# Patient Record
Sex: MALE | Race: WHITE | NOT HISPANIC OR LATINO | Employment: OTHER | URBAN - METROPOLITAN AREA
[De-identification: names, ages, dates, MRNs, and addresses within clinical notes are randomized per-mention and may not be internally consistent; named-entity substitution may affect disease eponyms.]

---

## 2021-01-07 ENCOUNTER — IMMUNIZATION (OUTPATIENT)
Dept: FAMILY MEDICINE | Facility: CLINIC | Age: 86
End: 2021-01-07
Payer: MEDICARE

## 2021-01-07 DIAGNOSIS — Z23 NEED FOR VACCINATION: ICD-10-CM

## 2021-01-07 PROCEDURE — 91300 COVID-19, MRNA, LNP-S, PF, 30 MCG/0.3 ML DOSE VACCINE: CPT | Mod: PBBFAC | Performed by: FAMILY MEDICINE

## 2021-01-28 ENCOUNTER — IMMUNIZATION (OUTPATIENT)
Dept: FAMILY MEDICINE | Facility: CLINIC | Age: 86
End: 2021-01-28
Payer: MEDICARE

## 2021-01-28 DIAGNOSIS — Z23 NEED FOR VACCINATION: Primary | ICD-10-CM

## 2021-01-28 PROCEDURE — 0002A COVID-19, MRNA, LNP-S, PF, 30 MCG/0.3 ML DOSE VACCINE: CPT | Mod: PBBFAC | Performed by: FAMILY MEDICINE

## 2021-01-28 PROCEDURE — 91300 COVID-19, MRNA, LNP-S, PF, 30 MCG/0.3 ML DOSE VACCINE: CPT | Mod: PBBFAC | Performed by: FAMILY MEDICINE

## 2022-02-02 PROBLEM — S42.291A CLOSED 4-PART FRACTURE OF PROXIMAL HUMERUS, RIGHT, INITIAL ENCOUNTER: Status: ACTIVE | Noted: 2022-02-02

## 2022-04-06 ENCOUNTER — TELEPHONE (OUTPATIENT)
Dept: ORTHOPEDICS | Facility: CLINIC | Age: 87
End: 2022-04-06
Payer: MEDICARE

## 2022-04-06 NOTE — TELEPHONE ENCOUNTER
Referral received from , s/w pt, appointment scheduled with  for right hand decreased ROM and sensation.

## 2022-05-04 PROBLEM — R20.2 NUMBNESS AND TINGLING IN RIGHT HAND: Status: ACTIVE | Noted: 2022-05-04

## 2022-05-04 PROBLEM — R20.0 NUMBNESS AND TINGLING IN RIGHT HAND: Status: ACTIVE | Noted: 2022-05-04

## 2022-05-16 ENCOUNTER — OFFICE VISIT (OUTPATIENT)
Dept: ORTHOPEDICS | Facility: CLINIC | Age: 87
End: 2022-05-16
Payer: MEDICARE

## 2022-05-16 VITALS — HEIGHT: 70 IN | BODY MASS INDEX: 31.21 KG/M2 | WEIGHT: 218 LBS

## 2022-05-16 DIAGNOSIS — G56.01 CARPAL TUNNEL SYNDROME ON RIGHT: Primary | ICD-10-CM

## 2022-05-16 PROCEDURE — 1101F PT FALLS ASSESS-DOCD LE1/YR: CPT | Mod: CPTII,S$GLB,, | Performed by: ORTHOPAEDIC SURGERY

## 2022-05-16 PROCEDURE — 1159F MED LIST DOCD IN RCRD: CPT | Mod: CPTII,S$GLB,, | Performed by: ORTHOPAEDIC SURGERY

## 2022-05-16 PROCEDURE — 1159F PR MEDICATION LIST DOCUMENTED IN MEDICAL RECORD: ICD-10-PCS | Mod: CPTII,S$GLB,, | Performed by: ORTHOPAEDIC SURGERY

## 2022-05-16 PROCEDURE — 3288F FALL RISK ASSESSMENT DOCD: CPT | Mod: CPTII,S$GLB,, | Performed by: ORTHOPAEDIC SURGERY

## 2022-05-16 PROCEDURE — 1126F PR PAIN SEVERITY QUANTIFIED, NO PAIN PRESENT: ICD-10-PCS | Mod: CPTII,S$GLB,, | Performed by: ORTHOPAEDIC SURGERY

## 2022-05-16 PROCEDURE — 1126F AMNT PAIN NOTED NONE PRSNT: CPT | Mod: CPTII,S$GLB,, | Performed by: ORTHOPAEDIC SURGERY

## 2022-05-16 PROCEDURE — 99999 PR PBB SHADOW E&M-EST. PATIENT-LVL II: CPT | Mod: PBBFAC,,, | Performed by: ORTHOPAEDIC SURGERY

## 2022-05-16 PROCEDURE — 99999 PR PBB SHADOW E&M-EST. PATIENT-LVL II: ICD-10-PCS | Mod: PBBFAC,,, | Performed by: ORTHOPAEDIC SURGERY

## 2022-05-16 PROCEDURE — 99203 PR OFFICE/OUTPT VISIT, NEW, LEVL III, 30-44 MIN: ICD-10-PCS | Mod: S$GLB,,, | Performed by: ORTHOPAEDIC SURGERY

## 2022-05-16 PROCEDURE — 99203 OFFICE O/P NEW LOW 30 MIN: CPT | Mod: S$GLB,,, | Performed by: ORTHOPAEDIC SURGERY

## 2022-05-16 PROCEDURE — 3288F PR FALLS RISK ASSESSMENT DOCUMENTED: ICD-10-PCS | Mod: CPTII,S$GLB,, | Performed by: ORTHOPAEDIC SURGERY

## 2022-05-16 PROCEDURE — 1101F PR PT FALLS ASSESS DOC 0-1 FALLS W/OUT INJ PAST YR: ICD-10-PCS | Mod: CPTII,S$GLB,, | Performed by: ORTHOPAEDIC SURGERY

## 2022-05-16 NOTE — PROGRESS NOTES
5/16/2022    Chief Complaint:  Chief Complaint   Patient presents with    Right Hand - Numbness, Pain, Swelling       HPI:  Max Ross is a 87 y.o. male, who presents to clinic today has a history of pain numbness and tingling to his right hand.  He states that this started after he had a shoulder replacement surgery.  He states that he was set up to do a nerve test but did not end up getting that done.  He states that he did have an injection in his right wrist a few weeks ago which did improve his pain but he is still having numbness.  He is here today for further evaluation and treatment options    PMHX:  Past Medical History:   Diagnosis Date    A-fib     Bladder cancer     x 3 occurrances.     Cataract of both eyes     Hypertension        PSHX:  Past Surgical History:   Procedure Laterality Date    APPENDECTOMY      CATARACT EXTRACTION Left 05/04/2022    FINGER SURGERY Right     ORCHIECTOMY      REVERSE TOTAL SHOULDER ARTHROPLASTY Right 02/03/2022    Procedure: ARTHROPLASTY, SHOULDER, TOTAL, REVERSE;  Surgeon: Leonidas Baer II, MD;  Location: [facility];  Service: Orthopedics;  Laterality: Right;    TONSILLECTOMY         FMHX:  Family History   Problem Relation Age of Onset    Cancer Mother     Cancer Father     Breast cancer Daughter        SOCHX:  Social History     Tobacco Use    Smoking status: Never Smoker    Smokeless tobacco: Never Used   Substance Use Topics    Alcohol use: Yes     Comment: occasional       ALLERGIES:  Patient has no known allergies.    CURRENT MEDICATIONS:  Current Outpatient Medications on File Prior to Visit   Medication Sig Dispense Refill    acetaminophen (TYLENOL) 325 MG tablet Take 2 tablets (650 mg total) by mouth every 6 (six) hours. 56 tablet 0    ELIQUIS 2.5 mg Tab Take 2.5 mg by mouth 2 (two) times a day.      enalapril (VASOTEC) 10 MG tablet Take 10 mg by mouth once daily.      gabapentin (NEURONTIN) 300 MG capsule Take 300 mg by mouth 4 (four)  "times daily.      MEN'S MULTI-VITAMIN ORAL Take 1 capsule by mouth once daily.      vitamins  A,C,E-zinc-copper (PRESERVISION AREDS) 14,320-650-200 unit-mg-unit Cap Take 1 capsule by mouth once daily.       No current facility-administered medications on file prior to visit.       REVIEW OF SYSTEMS:  Review of Systems   Constitutional: Negative for diaphoresis and fever.   HENT: Negative for ear pain, hearing loss, nosebleeds and tinnitus.    Eyes: Negative for pain and redness.   Respiratory: Negative for cough and shortness of breath.    Cardiovascular: Negative for chest pain and palpitations.   Gastrointestinal: Negative for blood in stool, constipation, diarrhea, nausea and vomiting.   Genitourinary: Negative for frequency and hematuria.   Musculoskeletal: Negative for back pain and myalgias.   Skin: Negative for itching and rash.   Neurological: Positive for tingling. Negative for dizziness, seizures, weakness and headaches.   Endo/Heme/Allergies: Negative for environmental allergies.   Psychiatric/Behavioral: The patient is not nervous/anxious.        GENERAL PHYSICAL EXAM:   Ht 5' 10" (1.778 m)   Wt 98.9 kg (218 lb)   BMI 31.28 kg/m²    GEN: well developed, well nourished, no acute distress   HENT: Normocephalic, atraumatic   EYES: No discharge, conjunctiva normal   NECK: Supple, non-tender   PULM: No wheezing, no respiratory distress   CV: RRR   ABD: Soft, non-tender    ORTHO EXAM:   Examination the right upper extremity reveals that there is no edema.  There are no skin changes.  Palpation produces no tenderness but he does have severe decreased sensation throughout the median distribution.  Ulnar and radial sensation is intact.  He does have a positive Tinel's and a positive Durkan's test at the wrist.  He has a 2+ radial pulse.  He does have stiffness with flexion and extension of his fingers related to arthritis    RADIOLOGY:   None    ASSESSMENT:   Right carpal tunnel syndrome    PLAN:  1. I have " discussed treatment going forward.  I will order a nerve conduction study as I feel this is important to confirm compression at the level of the wrist    2. I have discussed the possibility of carpal tunnel release in the future.  The patient states that if he does not have worsening of pain he may not undergo the procedure    3. Follow-up with me as soon as the nerve conduction study is completed    Answers for HPI/ROS submitted by the patient on 5/14/2022  unexpected weight change: No  appetite change : No  sleep disturbance: No  IMMUNOCOMPROMISED: No  dysphoric mood: No  visual disturbance: No  sinus pressure : No  food allergies: No  difficulty urinating: No  numbness: Yes  joint swelling: No  Pain Chronicity: new  History of trauma: No  Onset: more than 1 month ago  Frequency: constantly  Progression since onset: gradually improving  Injury mechanism: falling  injury location: at home  pain- numeric: 3/10  pain location: right hand  pain quality: numbing  Radiating Pain: No  Aggravating factors: flexion, rotation  inability to bear weight: No  joint locking: No  limited range of motion: Yes  stiffness: No  Treatments tried: heat, exercise, injection treatment  physical therapy: ineffective  Improvement on treatment: mild